# Patient Record
Sex: FEMALE | Race: WHITE | URBAN - METROPOLITAN AREA
[De-identification: names, ages, dates, MRNs, and addresses within clinical notes are randomized per-mention and may not be internally consistent; named-entity substitution may affect disease eponyms.]

---

## 2022-05-10 ENCOUNTER — APPOINTMENT (RX ONLY)
Dept: URBAN - METROPOLITAN AREA CLINIC 145 | Facility: CLINIC | Age: 67
Setting detail: DERMATOLOGY
End: 2022-05-10

## 2022-05-10 VITALS — WEIGHT: 120 LBS | HEIGHT: 64 IN

## 2022-05-10 DIAGNOSIS — Z42.1 ENCOUNTER FOR BREAST RECONSTRUCTION FOLLOWING MASTECTOMY: ICD-10-CM | Status: WORSENING

## 2022-05-10 DIAGNOSIS — T85.44 CAPSULAR CONTRACTURE OF BREAST IMPLANT: ICD-10-CM | Status: WORSENING

## 2022-05-10 DIAGNOSIS — N65.1 DISPROPORTION OF RECONSTRUCTED BREAST: ICD-10-CM

## 2022-05-10 DIAGNOSIS — Z85.3 PERSONAL HISTORY OF MALIGNANT NEOPLASM OF BREAST: ICD-10-CM | Status: IMPROVED

## 2022-05-10 PROBLEM — T85.44XA CAPSULAR CONTRACTURE OF BREAST IMPLANT, INITIAL ENCOUNTER: Status: ACTIVE | Noted: 2022-05-10

## 2022-05-10 PROCEDURE — ? COUNSELING -  BREAST RECONSTRUCTION

## 2022-05-10 PROCEDURE — 99204 OFFICE O/P NEW MOD 45 MIN: CPT

## 2022-05-10 PROCEDURE — ? RECOMMENDATIONS

## 2022-05-10 PROCEDURE — ? COUNSELING - ASYMMETRY OF NATIVE BREAST

## 2022-05-10 PROCEDURE — ? DEFER

## 2022-05-10 PROCEDURE — ? RECORDS REVIEWED

## 2022-05-10 PROCEDURE — ? CARE COORDINATION

## 2022-05-10 PROCEDURE — ? COUNSELING - CAPSULAR CONTRACTURE

## 2022-05-10 PROCEDURE — ? OBSERVATION

## 2022-05-10 ASSESSMENT — LOCATION SIMPLE DESCRIPTION DERM
LOCATION SIMPLE: LEFT BREAST
LOCATION SIMPLE: LEFT BREAST
LOCATION SIMPLE: RIGHT BREAST
LOCATION SIMPLE: RIGHT BREAST

## 2022-05-10 ASSESSMENT — LOCATION DETAILED DESCRIPTION DERM
LOCATION DETAILED: LEFT LATERAL BREAST 2-3:00 REGION
LOCATION DETAILED: RIGHT MEDIAL BREAST 4-5:00 REGION
LOCATION DETAILED: LEFT MEDIAL BREAST 11-12:00 REGION
LOCATION DETAILED: RIGHT MEDIAL BREAST 4-5:00 REGION
LOCATION DETAILED: LEFT MEDIAL BREAST 10-11:00 REGION
LOCATION DETAILED: LEFT PERIAREOLAR BREAST 3-4:00 REGION

## 2022-05-10 ASSESSMENT — LOCATION ZONE DERM
LOCATION ZONE: TRUNK
LOCATION ZONE: TRUNK

## 2022-05-10 NOTE — PROCEDURE: CARE COORDINATION
Pcp Name: Dr. Francia Klein [facility] Pcp Name: Dr. Francia Klein [facility]

## 2022-05-10 NOTE — PROCEDURE: RECOMMENDATIONS
Recommendations (Free Text): Patient is having a mammogram May 25th, 2022. Patient has had a silicone implant placed in the L breast during first reconstruction surgery. Recommended fat grafting to patient. Implant has grade 2-3 capsule. Advised patient to keep a close eye on top of breast for redness and irritation. If patient isn’t having pain or symptoms from breast implant, surgery isn’t necessary. Recommended Mastopexy to patient for lift for symmetry Recommended Place for Her in [place] for bra’s that can be covered by insurance. MRI recommended in the future for implant integrity testing with no-contrast. MRI with contrast advised if she feels lumps or bumps. Risk of leakage is 2-4%  as of current time. Recommendations (Free Text): Patient is having a mammogram May 25th, 2022. Patient has had a silicone implant placed in the L breast during first reconstruction surgery. Recommended fat grafting to patient. Implant has grade 2-3 capsule. Advised patient to keep a close eye on top of breast for redness and irritation. If patient isn’t having pain or symptoms from breast implant, surgery isn’t necessary. Recommended Mastopexy to patient for lift for symmetry Recommended Place for Her in [place] for bra’s that can be covered by insurance. MRI recommended in the future for implant integrity testing with no-contrast. MRI with contrast advised if she feels lumps or bumps. Risk of leakage is 2-4%  as of current time.

## 2022-05-10 NOTE — HPI: BREAST RECONSTRUCTION CONSULTATION
Is This A New Presentation, Or A Follow-Up?: Breast Reconstruction Consultation
Which Side(S)?: the left breast
How Severe Are Your Concerns?: moderate
Who Referred You?: Insurance
Who Is Your Medical Oncologist (If Known)?: Na
When Was The Diagnosis Confirmed?: 08/2018
Additional History: Pt has noted some changes to her left breast shape recently. No pain. Has areas of palpable implant folding left upper outer quadrant.
Who Is Your Previous Plastic Surgeon (If Known)?: Dr. Limon ([place])
Who Is Your Primary Care Provider (If Known)?: [facility]

## 2022-05-10 NOTE — PROCEDURE: RECORDS REVIEWED
Number Of Unique Sources Reviewed: 3+
Detail Level: Zone
Other Records Reviewed: Need Records from Plastic Surgeon- Dr. Limon in [place]

## 2022-05-10 NOTE — PROCEDURE: OBSERVATION
Detail Level: Detailed
Size Of Lesion In Cm (Optional): 0
Instructions (Optional): Routine surveillance and self breast exams

## 2022-05-10 NOTE — PROCEDURE: DEFER
Procedure To Be Performed At Next Visit: -- Select One
Detail Level: Detailed
Instructions (Optional): Pt advised of her right to a balancing procedure, if and when that is desired.
Instructions (Optional): Pt advised to return for any skin color changes over implant fold upper outer quad
Other Procedure: n/a